# Patient Record
(demographics unavailable — no encounter records)

---

## 2024-12-11 NOTE — PHYSICAL EXAM
[Chaperone Present] : A chaperone was present in the examining room during all aspects of the physical examination [26920] : A chaperone was present during the pelvic exam. [Appropriately responsive] : appropriately responsive [Alert] : alert [No Acute Distress] : no acute distress [No Lymphadenopathy] : no lymphadenopathy [Soft] : soft [Non-tender] : non-tender [Non-distended] : non-distended [No HSM] : No HSM [No Lesions] : no lesions [No Mass] : no mass [Oriented x3] : oriented x3 [Examination Of The Breasts] : a normal appearance [Breast Nipple Inversion] : inverted [No Masses] : no breast masses were palpable [Labia Majora] : normal [Labia Minora] : normal [Normal] : normal [Retroversion] : retroverted [Uterine Adnexae] : normal [FreeTextEntry2] : Sona Liang

## 2024-12-11 NOTE — DISCUSSION/SUMMARY
[FreeTextEntry1] : Patient had a normal exam and sonogram. Reassured conception rates reviewed with patient. Timed coitus reviewed. Will continue with vitamin iron folic acid. Patient will follow-up with the positive UCG. Or if no conception in the next 4 months time.

## 2024-12-11 NOTE — HISTORY OF PRESENT ILLNESS
[FreeTextEntry1] : Patient is here for gynecological follow-up noting lighter cycles for the past 2 months. Patient DC'd her oral contraceptives 6 months ago and is attempting conception. Patient is taking multivitamin iron and folic acid.

## 2025-01-15 NOTE — PHYSICAL EXAM
[Chaperone Present] : A chaperone was present in the examining room during all aspects of the physical examination [64510] : A chaperone was present during the pelvic exam. [FreeTextEntry2] : jn